# Patient Record
Sex: MALE | Race: WHITE | NOT HISPANIC OR LATINO
[De-identification: names, ages, dates, MRNs, and addresses within clinical notes are randomized per-mention and may not be internally consistent; named-entity substitution may affect disease eponyms.]

---

## 2021-03-24 ENCOUNTER — APPOINTMENT (OUTPATIENT)
Dept: ULTRASOUND IMAGING | Facility: CLINIC | Age: 56
End: 2021-03-24
Payer: COMMERCIAL

## 2021-03-24 PROCEDURE — 76857 US EXAM PELVIC LIMITED: CPT

## 2021-03-24 PROCEDURE — 76882 US LMTD JT/FCL EVL NVASC XTR: CPT | Mod: LT

## 2021-11-23 ENCOUNTER — NON-APPOINTMENT (OUTPATIENT)
Age: 56
End: 2021-11-23

## 2021-11-23 ENCOUNTER — APPOINTMENT (OUTPATIENT)
Dept: UROLOGY | Facility: CLINIC | Age: 56
End: 2021-11-23
Payer: COMMERCIAL

## 2021-11-23 VITALS
OXYGEN SATURATION: 98 % | SYSTOLIC BLOOD PRESSURE: 108 MMHG | HEART RATE: 62 BPM | DIASTOLIC BLOOD PRESSURE: 67 MMHG | BODY MASS INDEX: 20.73 KG/M2 | WEIGHT: 140 LBS | TEMPERATURE: 96.8 F | HEIGHT: 69 IN

## 2021-11-23 DIAGNOSIS — Z83.3 FAMILY HISTORY OF DIABETES MELLITUS: ICD-10-CM

## 2021-11-23 DIAGNOSIS — N43.40 SPERMATOCELE OF EPIDIDYMIS, UNSPECIFIED: ICD-10-CM

## 2021-11-23 DIAGNOSIS — E78.2 MIXED HYPERLIPIDEMIA: ICD-10-CM

## 2021-11-23 DIAGNOSIS — Z78.9 OTHER SPECIFIED HEALTH STATUS: ICD-10-CM

## 2021-11-23 DIAGNOSIS — R19.4 CHANGE IN BOWEL HABIT: ICD-10-CM

## 2021-11-23 DIAGNOSIS — N50.812 LEFT TESTICULAR PAIN: ICD-10-CM

## 2021-11-23 PROBLEM — Z00.00 ENCOUNTER FOR PREVENTIVE HEALTH EXAMINATION: Status: ACTIVE | Noted: 2021-11-23

## 2021-11-23 PROCEDURE — 99203 OFFICE O/P NEW LOW 30 MIN: CPT

## 2021-11-23 RX ORDER — TAMSULOSIN HYDROCHLORIDE 0.4 MG/1
0.4 CAPSULE ORAL
Refills: 0 | Status: DISCONTINUED | COMMUNITY
End: 2021-11-23

## 2021-11-23 NOTE — PHYSICAL EXAM
[General Appearance - Well Developed] : well developed [General Appearance - Well Nourished] : well nourished [Normal Appearance] : normal appearance [Well Groomed] : well groomed [General Appearance - In No Acute Distress] : no acute distress [Edema] : no peripheral edema [Respiration, Rhythm And Depth] : normal respiratory rhythm and effort [Exaggerated Use Of Accessory Muscles For Inspiration] : no accessory muscle use [Abdomen Soft] : soft [Costovertebral Angle Tenderness] : no ~M costovertebral angle tenderness [Urethral Meatus] : meatus normal [Urinary Bladder Findings] : the bladder was normal on palpation [Scrotum] : the scrotum was normal [Testes Mass (___cm)] : there were no testicular masses [FreeTextEntry1] : small and painful cystic lesion close to the left epididymal head. Palpation of this lesion caused pain  - 8/10 [Normal Station and Gait] : the gait and station were normal for the patient's age [] : no rash [No Focal Deficits] : no focal deficits [Oriented To Time, Place, And Person] : oriented to person, place, and time [Affect] : the affect was normal [Mood] : the mood was normal [Not Anxious] : not anxious [No Palpable Adenopathy] : no palpable adenopathy

## 2021-11-23 NOTE — HISTORY OF PRESENT ILLNESS
[FreeTextEntry1] : 56 year-old patient presented today with the left testicular pain. This pain takes place one in a while but during these episodes pain could be described as 6-7/10\par Patient denies chills and fever he also denies STD\par Patient also complained on the left low quadrant abdominal pain and recently noticed change in the bowel movements.

## 2021-11-23 NOTE — REVIEW OF SYSTEMS
[Told you have blood in urine on a urine test] : told blood was present in a urine test [Wake up at night to urinate  How many times?  ___] : wakes up to urinate [unfilled] times during the night [Strong urge to urinate] : strong urge to urinate [Interrupted urine stream] : interrupted urine stream [Leakage of urine with urgency] : leakage of urine with urgency [Urine Infection (bladder/kidney)] : denies bladder/kidney infections [Pain during urination] : denies pain during urination [Pain at onset of urination] : denies pain during onset of urination [Pain after urination] : denies pain after urination [Blood in urine that you can see] : denies seeing blood in urine [Discharge from urine canal] : denies discharge from urine canal [History of kidney stones] : denies history of kidney stones [Urine retention] : denies urine retention [Bladder pressure] : denies bladder pressure [Strain or push to urinate] : denies straining or pushing to urinate [Wait a long time to urinate] : denies waiting a long time to urinate [Slow urine stream] : denies slow urine stream [Bladder fullness after urinating] : denies bladder fullness after urinating [Increased pain/discomfort with bladder filling] : denies increased pain/discomfort with bladder filling [Bladder problems as child. If yes, describe..] : denies bladder problems as child [Leakage of urine with straining, coughing, laughing] : denies leakage of urine with straining, coughing, and/or laughing [Unaware of when urine is leaking] : aware of when urine is leaking

## 2021-11-23 NOTE — ASSESSMENT
[FreeTextEntry1] : Patient suffers from periodic left testicular pains.\par On physical exam we found a painful lesion close to the head of left epididymal head. \par Patient also suffers from the change in bowel habits \par His left low abdomen is mildly tender\par \par I recommended - US scrotum\par I also advised patient to see his PCP and GI for the left abdominal pain and changes in the bowel habits.

## 2021-11-23 NOTE — LETTER BODY
[Consult Letter:] : I had the pleasure of evaluating your patient, [unfilled]. [Consult Closing:] : Thank you very much for allowing me to participate in the care of this patient.  If you have any questions, please do not hesitate to contact me. [FreeTextEntry1] : Patient suffers from periodic left testicular pains. On physical exam we found a painful lesion close to the head of left epididymal head.  Patient also suffers from the change in bowel habits  His left low abdomen is mildly tender  I recommended - US scrotum I also advised patient to see his PCP and GI for the left abdominal pain and changes in the bowel habits.

## 2022-12-09 ENCOUNTER — APPOINTMENT (OUTPATIENT)
Dept: CT IMAGING | Facility: CLINIC | Age: 57
End: 2022-12-09

## 2022-12-09 PROCEDURE — 74178 CT ABD&PLV WO CNTR FLWD CNTR: CPT

## 2022-12-19 ENCOUNTER — APPOINTMENT (OUTPATIENT)
Dept: UROLOGY | Facility: CLINIC | Age: 57
End: 2022-12-19

## 2022-12-19 ENCOUNTER — NON-APPOINTMENT (OUTPATIENT)
Age: 57
End: 2022-12-19

## 2022-12-19 VITALS
HEART RATE: 66 BPM | SYSTOLIC BLOOD PRESSURE: 129 MMHG | BODY MASS INDEX: 20.73 KG/M2 | WEIGHT: 140 LBS | TEMPERATURE: 97.3 F | HEIGHT: 69 IN | DIASTOLIC BLOOD PRESSURE: 69 MMHG

## 2022-12-19 DIAGNOSIS — N20.0 CALCULUS OF KIDNEY: ICD-10-CM

## 2022-12-19 DIAGNOSIS — N20.1 CALCULUS OF URETER: ICD-10-CM

## 2022-12-19 PROCEDURE — 99214 OFFICE O/P EST MOD 30 MIN: CPT

## 2022-12-19 NOTE — PHYSICAL EXAM
[General Appearance - Well Developed] : well developed [General Appearance - Well Nourished] : well nourished [Normal Appearance] : normal appearance [Well Groomed] : well groomed [General Appearance - In No Acute Distress] : no acute distress [Abdomen Soft] : soft [Costovertebral Angle Tenderness] : no ~M costovertebral angle tenderness [FreeTextEntry1] : Tender in the left low abdomen  [Urethral Meatus] : meatus normal [Urinary Bladder Findings] : the bladder was normal on palpation [Scrotum] : the scrotum was normal [Testes Mass (___cm)] : there were no testicular masses [Edema] : no peripheral edema [] : no respiratory distress [Respiration, Rhythm And Depth] : normal respiratory rhythm and effort [Exaggerated Use Of Accessory Muscles For Inspiration] : no accessory muscle use [Oriented To Time, Place, And Person] : oriented to person, place, and time [Affect] : the affect was normal [Mood] : the mood was normal [Not Anxious] : not anxious [Normal Station and Gait] : the gait and station were normal for the patient's age [No Focal Deficits] : no focal deficits [No Palpable Adenopathy] : no palpable adenopathy

## 2022-12-19 NOTE — HISTORY OF PRESENT ILLNESS
[FreeTextEntry1] : 57 year-old patient presented today with the left testicular pain. \par Recently patient has a CT scan done for the left abdominal pain.  \par On the CT scan nephrolithiasis was found this why he was sent to me.

## 2022-12-19 NOTE — ASSESSMENT
[FreeTextEntry1] : Patient presented today to discuss the need for the treatment of the nephrolithiasis.\par I personally reviewed his most recent CT scan and discussed with him the findings.  There is a 7 on 11 millimeters stone in the right upper ureter that caused right hydronephrosis.  In the left kidney there is a 7 mm stone in the left low calyx.\par I discussed with the patient the need to do the right ureteroscopy as soon as possible because he has obstruction of the right renal system.\par I asked patient to check urine culture before the procedure.\par Will try to do the procedure soon as possible.

## 2022-12-21 LAB — BACTERIA UR CULT: NORMAL

## 2022-12-23 ENCOUNTER — APPOINTMENT (OUTPATIENT)
Dept: UROLOGY | Facility: HOSPITAL | Age: 57
End: 2022-12-23
Payer: COMMERCIAL

## 2022-12-23 PROCEDURE — 88300 SURGICAL PATH GROSS: CPT | Mod: 26

## 2022-12-30 ENCOUNTER — APPOINTMENT (OUTPATIENT)
Dept: UROLOGY | Facility: CLINIC | Age: 57
End: 2022-12-30

## 2022-12-30 ENCOUNTER — APPOINTMENT (OUTPATIENT)
Dept: UROLOGY | Facility: CLINIC | Age: 57
End: 2022-12-30
Payer: COMMERCIAL

## 2022-12-30 VITALS
SYSTOLIC BLOOD PRESSURE: 120 MMHG | DIASTOLIC BLOOD PRESSURE: 69 MMHG | BODY MASS INDEX: 20.73 KG/M2 | WEIGHT: 140 LBS | TEMPERATURE: 97.8 F | HEIGHT: 69 IN | HEART RATE: 79 BPM

## 2022-12-30 PROCEDURE — 52310 CYSTOSCOPY AND TREATMENT: CPT

## 2023-05-16 ENCOUNTER — APPOINTMENT (OUTPATIENT)
Dept: ULTRASOUND IMAGING | Facility: CLINIC | Age: 58
End: 2023-05-16
Payer: COMMERCIAL

## 2023-05-16 PROCEDURE — 76775 US EXAM ABDO BACK WALL LIM: CPT

## 2023-05-19 ENCOUNTER — APPOINTMENT (OUTPATIENT)
Dept: UROLOGY | Facility: CLINIC | Age: 58
End: 2023-05-19
Payer: COMMERCIAL

## 2023-05-19 VITALS
WEIGHT: 170 LBS | HEART RATE: 77 BPM | BODY MASS INDEX: 25.18 KG/M2 | DIASTOLIC BLOOD PRESSURE: 74 MMHG | HEIGHT: 69 IN | SYSTOLIC BLOOD PRESSURE: 128 MMHG | TEMPERATURE: 97.3 F

## 2023-05-19 PROCEDURE — 99214 OFFICE O/P EST MOD 30 MIN: CPT

## 2023-05-19 RX ORDER — TRAMADOL HYDROCHLORIDE 50 MG/1
50 TABLET, COATED ORAL
Qty: 15 | Refills: 0 | Status: DISCONTINUED | COMMUNITY
Start: 2022-12-24 | End: 2023-05-19

## 2023-05-19 RX ORDER — TAMSULOSIN HYDROCHLORIDE 0.4 MG/1
0.4 CAPSULE ORAL
Qty: 15 | Refills: 0 | Status: DISCONTINUED | COMMUNITY
Start: 2022-12-24 | End: 2023-05-19

## 2023-05-19 RX ORDER — CIPROFLOXACIN HYDROCHLORIDE 500 MG/1
500 TABLET, FILM COATED ORAL TWICE DAILY
Qty: 10 | Refills: 0 | Status: DISCONTINUED | COMMUNITY
Start: 2022-12-24 | End: 2023-05-19

## 2023-05-19 RX ORDER — ACETAMINOPHEN 500 MG/1
500 TABLET, COATED ORAL
Qty: 30 | Refills: 0 | Status: DISCONTINUED | COMMUNITY
Start: 2022-12-24 | End: 2023-05-19

## 2023-05-19 NOTE — ASSESSMENT
[FreeTextEntry1] : I personally reviewed patient's US and found that he has right hydronephrosis\par I explained to him that we have to do CTU to exclude right ureteral stricture that could be as a consequence of pervious impacted stone

## 2023-05-19 NOTE — HISTORY OF PRESENT ILLNESS
[FreeTextEntry1] : 57 year-old patient was sent to our office by PCP because of hydronephrosis - Right \par Patient had elevated Creatinine and was sent for US that showed Right hydronephrosis\par Patient had Right  ureteroscopy 8 months ago for Right upper ureteral impacted stone with sever Right hydronephrosis \par Today has no chills and fever\par No pain

## 2023-05-19 NOTE — PHYSICAL EXAM
[General Appearance - Well Developed] : well developed [General Appearance - Well Nourished] : well nourished [Normal Appearance] : normal appearance [Well Groomed] : well groomed [General Appearance - In No Acute Distress] : no acute distress [Abdomen Soft] : soft [Costovertebral Angle Tenderness] : no ~M costovertebral angle tenderness [Urethral Meatus] : meatus normal [Urinary Bladder Findings] : the bladder was normal on palpation [Scrotum] : the scrotum was normal [Testes Mass (___cm)] : there were no testicular masses [Edema] : no peripheral edema [] : no respiratory distress [Respiration, Rhythm And Depth] : normal respiratory rhythm and effort [Exaggerated Use Of Accessory Muscles For Inspiration] : no accessory muscle use [Oriented To Time, Place, And Person] : oriented to person, place, and time [Affect] : the affect was normal [Mood] : the mood was normal [Not Anxious] : not anxious [Normal Station and Gait] : the gait and station were normal for the patient's age [No Focal Deficits] : no focal deficits [No Palpable Adenopathy] : no palpable adenopathy

## 2023-06-07 ENCOUNTER — APPOINTMENT (OUTPATIENT)
Dept: CT IMAGING | Facility: CLINIC | Age: 58
End: 2023-06-07
Payer: COMMERCIAL

## 2023-06-07 PROCEDURE — 74178 CT ABD&PLV WO CNTR FLWD CNTR: CPT

## 2023-06-15 ENCOUNTER — APPOINTMENT (OUTPATIENT)
Dept: UROLOGY | Facility: CLINIC | Age: 58
End: 2023-06-15
Payer: COMMERCIAL

## 2023-06-15 VITALS
WEIGHT: 165 LBS | HEIGHT: 69 IN | SYSTOLIC BLOOD PRESSURE: 130 MMHG | BODY MASS INDEX: 24.44 KG/M2 | HEART RATE: 72 BPM | TEMPERATURE: 98.1 F | DIASTOLIC BLOOD PRESSURE: 87 MMHG

## 2023-06-15 PROCEDURE — 99213 OFFICE O/P EST LOW 20 MIN: CPT

## 2023-06-18 NOTE — PHYSICAL EXAM
[General Appearance - Well Developed] : well developed [General Appearance - Well Nourished] : well nourished [Normal Appearance] : normal appearance [Well Groomed] : well groomed [General Appearance - In No Acute Distress] : no acute distress [Abdomen Soft] : soft [Costovertebral Angle Tenderness] : no ~M costovertebral angle tenderness [Urethral Meatus] : meatus normal [Scrotum] : the scrotum was normal [Urinary Bladder Findings] : the bladder was normal on palpation [Testes Mass (___cm)] : there were no testicular masses [Edema] : no peripheral edema [] : no respiratory distress [Respiration, Rhythm And Depth] : normal respiratory rhythm and effort [Exaggerated Use Of Accessory Muscles For Inspiration] : no accessory muscle use [Oriented To Time, Place, And Person] : oriented to person, place, and time [Affect] : the affect was normal [Mood] : the mood was normal [Not Anxious] : not anxious [Normal Station and Gait] : the gait and station were normal for the patient's age [No Focal Deficits] : no focal deficits [No Palpable Adenopathy] : no palpable adenopathy

## 2023-06-18 NOTE — ASSESSMENT
[FreeTextEntry1] : I personally reviewed patient's CTU and explained to him that his right kidney is obstructed.  There is a hydronephrosis.  However patient's parenchyma looks good.\par We decided to perform right ureteral pyeloscopy as soon as possible with the laser treatment of the laser and insertion of the double-J stent.\par I explained to the patient in details the procedure and its postoperative process

## 2023-06-18 NOTE — HISTORY OF PRESENT ILLNESS
[FreeTextEntry1] : 57 year-old patient presented today to discuss the recent CTU.  \par Previous patient was seen in our office because of hydronephrosis - Right \par Patient had elevated Creatinine and was sent for US that showed Right hydronephrosis\par Patient had Right  ureteroscopy 8 months ago for Right upper ureteral impacted stone with sever Right hydronephrosis \par

## 2023-07-07 ENCOUNTER — APPOINTMENT (OUTPATIENT)
Dept: UROLOGY | Facility: HOSPITAL | Age: 58
End: 2023-07-07

## 2023-07-13 ENCOUNTER — APPOINTMENT (OUTPATIENT)
Dept: UROLOGY | Facility: CLINIC | Age: 58
End: 2023-07-13
Payer: COMMERCIAL

## 2023-07-13 VITALS
SYSTOLIC BLOOD PRESSURE: 132 MMHG | WEIGHT: 165 LBS | HEART RATE: 80 BPM | TEMPERATURE: 97.3 F | BODY MASS INDEX: 24.44 KG/M2 | HEIGHT: 69 IN | DIASTOLIC BLOOD PRESSURE: 77 MMHG

## 2023-07-13 DIAGNOSIS — T83.84XD: ICD-10-CM

## 2023-07-13 DIAGNOSIS — R39.15 URGENCY OF URINATION: ICD-10-CM

## 2023-07-13 PROCEDURE — 99212 OFFICE O/P EST SF 10 MIN: CPT

## 2023-07-13 NOTE — ASSESSMENT
[FreeTextEntry1] : Patient is 1 week after the procedure.\par Feels good.\par His only concern is urgency and frequency to urinate that could be explained by the large diameter stent.\par I prescribe patient's tamsulosin and Myrbetriq.\par We will take out the stent in 6 weeks after the procedure

## 2023-07-13 NOTE — HISTORY OF PRESENT ILLNESS
[FreeTextEntry1] : 57 year-old patient presented today for the postoperative follow-up. \par Last time we did right flexible ureteroscopy with laser ablation of right ureteral stricture. \par We inserted the double-J stent that is 8 Vietnamese\par We are planning to take out the stent in 6 weeks after the procedure\par Today patient reported urgency frequency burning sensation and sometimes dull pain in the right lumbar area.\par No chills no fever no hematuria

## 2023-08-28 ENCOUNTER — APPOINTMENT (OUTPATIENT)
Dept: UROLOGY | Facility: CLINIC | Age: 58
End: 2023-08-28
Payer: COMMERCIAL

## 2023-08-28 VITALS
WEIGHT: 165 LBS | TEMPERATURE: 97.3 F | HEART RATE: 71 BPM | SYSTOLIC BLOOD PRESSURE: 118 MMHG | DIASTOLIC BLOOD PRESSURE: 71 MMHG | HEIGHT: 69 IN | BODY MASS INDEX: 24.44 KG/M2

## 2023-08-28 PROCEDURE — 52315 CYSTOSCOPY AND TREATMENT: CPT

## 2023-09-15 ENCOUNTER — RESULT REVIEW (OUTPATIENT)
Age: 58
End: 2023-09-15

## 2023-09-15 ENCOUNTER — APPOINTMENT (OUTPATIENT)
Dept: CT IMAGING | Facility: CLINIC | Age: 58
End: 2023-09-15
Payer: COMMERCIAL

## 2023-09-15 PROCEDURE — 74178 CT ABD&PLV WO CNTR FLWD CNTR: CPT

## 2023-09-20 ENCOUNTER — APPOINTMENT (OUTPATIENT)
Dept: OPHTHALMOLOGY | Facility: CLINIC | Age: 58
End: 2023-09-20
Payer: COMMERCIAL

## 2023-09-20 ENCOUNTER — NON-APPOINTMENT (OUTPATIENT)
Age: 58
End: 2023-09-20

## 2023-09-20 PROCEDURE — 92004 COMPRE OPH EXAM NEW PT 1/>: CPT

## 2023-09-27 ENCOUNTER — APPOINTMENT (OUTPATIENT)
Dept: UROLOGY | Facility: CLINIC | Age: 58
End: 2023-09-27
Payer: COMMERCIAL

## 2023-09-27 VITALS
SYSTOLIC BLOOD PRESSURE: 157 MMHG | HEART RATE: 59 BPM | TEMPERATURE: 98.3 F | DIASTOLIC BLOOD PRESSURE: 89 MMHG | HEIGHT: 69 IN

## 2023-09-27 PROCEDURE — 99214 OFFICE O/P EST MOD 30 MIN: CPT

## 2023-10-27 ENCOUNTER — APPOINTMENT (OUTPATIENT)
Dept: OPHTHALMOLOGY | Facility: CLINIC | Age: 58
End: 2023-10-27

## 2023-10-27 ENCOUNTER — APPOINTMENT (OUTPATIENT)
Dept: UROLOGY | Facility: CLINIC | Age: 58
End: 2023-10-27
Payer: COMMERCIAL

## 2023-10-27 VITALS
WEIGHT: 165 LBS | SYSTOLIC BLOOD PRESSURE: 129 MMHG | HEART RATE: 66 BPM | BODY MASS INDEX: 24.44 KG/M2 | DIASTOLIC BLOOD PRESSURE: 80 MMHG | HEIGHT: 69 IN | TEMPERATURE: 97.3 F

## 2023-10-27 PROCEDURE — 99214 OFFICE O/P EST MOD 30 MIN: CPT | Mod: 25

## 2023-11-02 ENCOUNTER — APPOINTMENT (OUTPATIENT)
Dept: UROLOGY | Facility: CLINIC | Age: 58
End: 2023-11-02

## 2023-11-03 ENCOUNTER — APPOINTMENT (OUTPATIENT)
Dept: UROLOGY | Facility: CLINIC | Age: 58
End: 2023-11-03
Payer: COMMERCIAL

## 2023-11-03 PROCEDURE — 99214 OFFICE O/P EST MOD 30 MIN: CPT

## 2023-11-10 ENCOUNTER — APPOINTMENT (OUTPATIENT)
Dept: UROLOGY | Facility: CLINIC | Age: 58
End: 2023-11-10
Payer: COMMERCIAL

## 2023-11-10 VITALS
HEART RATE: 74 BPM | DIASTOLIC BLOOD PRESSURE: 74 MMHG | OXYGEN SATURATION: 98 % | BODY MASS INDEX: 25.18 KG/M2 | WEIGHT: 170 LBS | SYSTOLIC BLOOD PRESSURE: 121 MMHG | TEMPERATURE: 97.2 F | HEIGHT: 69 IN

## 2023-11-10 DIAGNOSIS — N13.30 UNSPECIFIED HYDRONEPHROSIS: ICD-10-CM

## 2023-11-10 DIAGNOSIS — N13.5 CROSSING VESSEL AND STRICTURE OF URETER W/OUT HYDRONEPHROSIS: ICD-10-CM

## 2023-11-10 DIAGNOSIS — N26.1 ATROPHY OF KIDNEY (TERMINAL): ICD-10-CM

## 2023-11-10 DIAGNOSIS — N13.1 HYDRONEPHROSIS WITH URETERAL STRICTURE, NOT ELSEWHERE CLASSIFIED: ICD-10-CM

## 2023-11-10 PROCEDURE — 99214 OFFICE O/P EST MOD 30 MIN: CPT

## 2023-11-10 RX ORDER — MIRABEGRON 25 MG/1
25 TABLET, FILM COATED, EXTENDED RELEASE ORAL DAILY
Qty: 30 | Refills: 2 | Status: DISCONTINUED | COMMUNITY
Start: 2023-07-13 | End: 2023-11-10

## 2023-11-10 RX ORDER — COLCHICINE 0.6 MG/1
0.6 TABLET ORAL TWICE DAILY
Qty: 60 | Refills: 3 | Status: DISCONTINUED | COMMUNITY
Start: 2023-07-08 | End: 2023-11-10

## 2023-11-12 PROBLEM — N13.1 HYDRONEPHROSIS WITH URETERAL STRICTURE, NOT ELSEWHERE CLASSIFIED: Status: ACTIVE | Noted: 2023-11-12

## 2023-11-12 PROBLEM — N26.1 ATROPHY, KIDNEY: Status: ACTIVE | Noted: 2023-11-03

## 2023-11-12 PROBLEM — N13.1 HYDRONEPHROSIS WITH URETERAL STRICTURE, NOT ELSEWHERE CLASSIFIED: Status: RESOLVED | Noted: 2023-06-15 | Resolved: 2023-11-12

## 2023-11-12 PROBLEM — N13.30 HYDRONEPHROSIS, RIGHT: Status: ACTIVE | Noted: 2022-12-19

## 2023-11-12 PROBLEM — N13.5 URETERAL STRICTURE: Status: ACTIVE | Noted: 2023-11-12

## 2024-01-05 ENCOUNTER — APPOINTMENT (OUTPATIENT)
Dept: NUCLEAR MEDICINE | Facility: HOSPITAL | Age: 59
End: 2024-01-05
Payer: COMMERCIAL

## 2024-01-05 ENCOUNTER — OUTPATIENT (OUTPATIENT)
Dept: OUTPATIENT SERVICES | Facility: HOSPITAL | Age: 59
LOS: 1 days | End: 2024-01-05

## 2024-01-05 DIAGNOSIS — N20.1 CALCULUS OF URETER: ICD-10-CM

## 2024-01-05 PROCEDURE — 78707 K FLOW/FUNCT IMAGE W/O DRUG: CPT | Mod: 26

## 2024-02-29 ENCOUNTER — OUTPATIENT (OUTPATIENT)
Dept: OUTPATIENT SERVICES | Facility: HOSPITAL | Age: 59
LOS: 1 days | End: 2024-02-29

## 2024-02-29 VITALS
OXYGEN SATURATION: 98 % | SYSTOLIC BLOOD PRESSURE: 144 MMHG | HEIGHT: 69 IN | RESPIRATION RATE: 16 BRPM | HEART RATE: 83 BPM | DIASTOLIC BLOOD PRESSURE: 90 MMHG | TEMPERATURE: 97 F | WEIGHT: 171.96 LBS

## 2024-02-29 DIAGNOSIS — N13.5 CROSSING VESSEL AND STRICTURE OF URETER WITHOUT HYDRONEPHROSIS: ICD-10-CM

## 2024-02-29 DIAGNOSIS — Z93.6 OTHER ARTIFICIAL OPENINGS OF URINARY TRACT STATUS: Chronic | ICD-10-CM

## 2024-02-29 DIAGNOSIS — Z96.0 PRESENCE OF UROGENITAL IMPLANTS: Chronic | ICD-10-CM

## 2024-02-29 LAB
ANION GAP SERPL CALC-SCNC: 8 MMOL/L — SIGNIFICANT CHANGE UP (ref 7–14)
BLD GP AB SCN SERPL QL: NEGATIVE — SIGNIFICANT CHANGE UP
BUN SERPL-MCNC: 19 MG/DL — SIGNIFICANT CHANGE UP (ref 7–23)
CALCIUM SERPL-MCNC: 9.2 MG/DL — SIGNIFICANT CHANGE UP (ref 8.4–10.5)
CHLORIDE SERPL-SCNC: 105 MMOL/L — SIGNIFICANT CHANGE UP (ref 98–107)
CO2 SERPL-SCNC: 27 MMOL/L — SIGNIFICANT CHANGE UP (ref 22–31)
CREAT SERPL-MCNC: 1.4 MG/DL — HIGH (ref 0.5–1.3)
EGFR: 58 ML/MIN/1.73M2 — LOW
GLUCOSE SERPL-MCNC: 94 MG/DL — SIGNIFICANT CHANGE UP (ref 70–99)
HCT VFR BLD CALC: 44.9 % — SIGNIFICANT CHANGE UP (ref 39–50)
HGB BLD-MCNC: 14.7 G/DL — SIGNIFICANT CHANGE UP (ref 13–17)
MCHC RBC-ENTMCNC: 29.5 PG — SIGNIFICANT CHANGE UP (ref 27–34)
MCHC RBC-ENTMCNC: 32.7 GM/DL — SIGNIFICANT CHANGE UP (ref 32–36)
MCV RBC AUTO: 90 FL — SIGNIFICANT CHANGE UP (ref 80–100)
NRBC # BLD: 0 /100 WBCS — SIGNIFICANT CHANGE UP (ref 0–0)
NRBC # FLD: 0 K/UL — SIGNIFICANT CHANGE UP (ref 0–0)
PLATELET # BLD AUTO: 164 K/UL — SIGNIFICANT CHANGE UP (ref 150–400)
POTASSIUM SERPL-MCNC: 4.3 MMOL/L — SIGNIFICANT CHANGE UP (ref 3.5–5.3)
POTASSIUM SERPL-SCNC: 4.3 MMOL/L — SIGNIFICANT CHANGE UP (ref 3.5–5.3)
RBC # BLD: 4.99 M/UL — SIGNIFICANT CHANGE UP (ref 4.2–5.8)
RBC # FLD: 12.5 % — SIGNIFICANT CHANGE UP (ref 10.3–14.5)
RH IG SCN BLD-IMP: POSITIVE — SIGNIFICANT CHANGE UP
RH IG SCN BLD-IMP: POSITIVE — SIGNIFICANT CHANGE UP
SODIUM SERPL-SCNC: 140 MMOL/L — SIGNIFICANT CHANGE UP (ref 135–145)
WBC # BLD: 3.27 K/UL — LOW (ref 3.8–10.5)
WBC # FLD AUTO: 3.27 K/UL — LOW (ref 3.8–10.5)

## 2024-02-29 RX ORDER — SODIUM CHLORIDE 9 MG/ML
1000 INJECTION, SOLUTION INTRAVENOUS
Refills: 0 | Status: DISCONTINUED | OUTPATIENT
Start: 2024-03-06 | End: 2024-03-07

## 2024-02-29 NOTE — H&P PST ADULT - PROBLEM SELECTOR PLAN 1
Patient tentatively scheduled for right nephrectomy     Pre-op instructions provided. Hibiclens provided with instructions and was signed by patient. Teach-back method was utilized to assess patient's understanding. Patient verbalized understanding.    ordered CBC, BMP, Type and Retype Patient tentatively scheduled for right nephrectomy     Pre-op instructions provided. Hibiclens provided with instructions and was signed by patient. Teach-back method was utilized to assess patient's understanding. Patient verbalized understanding.    ordered CBC, BMP, urine culture and Type and Retype

## 2024-02-29 NOTE — H&P PST ADULT - NSICDXPASTMEDICALHX_GEN_ALL_CORE_FT
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

## 2024-02-29 NOTE — H&P PST ADULT - NSANTHOSAYNRD_GEN_A_CORE
No. RAQUEL screening performed.  STOP BANG Legend: 0-2 = LOW Risk; 3-4 = INTERMEDIATE Risk; 5-8 = HIGH Risk

## 2024-02-29 NOTE — H&P PST ADULT - NSICDXFAMILYHX_GEN_ALL_CORE_FT
FAMILY HISTORY:  Mother  Still living? Unknown  Family history of gastric cancer, Age at diagnosis: Age Unknown

## 2024-02-29 NOTE — H&P PST ADULT - HISTORY OF PRESENT ILLNESS
58 year-old Patient is known to suffer from the right ureter stricture says this was impacted stone. stricture was treated with laser , after 2 months of the stent removal follow diagnostic revealed reoccurrence hydronephrosis,  insert the double-J stent failed and patient got the right nephrostomy., now patient is scheduled for righ nephrectomy  58 year-old Patient with hx of the right ureter stricture secondary to the impacted stone. Stricture was treated with laser and JJ stent placement , after 2 months of the stent removal and follow up diagnostic image revealed reoccurrence hydronephrosis,  Patient underwent insertion of the  double-J stent, which was failed and patient had the right nephrostomy., complications lead to atrophy of the right kidney, now patient is scheduled for right nephrectomy

## 2024-02-29 NOTE — H&P PST ADULT - RESPIRATORY
normal/clear to auscultation bilaterally/no wheezes/no rales/no rhonchi normal/clear to auscultation bilaterally/no wheezes/no rales

## 2024-03-01 LAB
CULTURE RESULTS: NO GROWTH — SIGNIFICANT CHANGE UP
SPECIMEN SOURCE: SIGNIFICANT CHANGE UP

## 2024-03-05 ENCOUNTER — TRANSCRIPTION ENCOUNTER (OUTPATIENT)
Age: 59
End: 2024-03-05

## 2024-03-05 NOTE — ASU PATIENT PROFILE, ADULT - NSICDXPASTMEDICALHX_GEN_ALL_CORE_FT
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

## 2024-03-05 NOTE — ASU PATIENT PROFILE, ADULT - HEALTH/HEALTHCARE ANXIETIES, PROFILE
Refill passed per [facility] protocol. Requested Prescriptions   Pending Prescriptions Disp Refills    pravastatin 10 MG Oral Tab [Pharmacy Med Name: pravastatin 10 mg tablet] 90 tablet 1     Sig: Take 1 tablet (10 mg total) by mouth nightly.         Cholesterol Medication Protocol Passed - 9/22/2021  7:49 AM        Passed - ALT in past 12 months        Passed - LDL in past 12 months        Passed - Last ALT < 80       Lab Results   Component Value Date    ALT 40 09/04/2021             Passed - Last LDL < 130     Lab Results   Component Value Date     (H) 09/04/2021               Passed - Appointment in past 12 or next 3 months               Future Appointments         Provider Department Appt Notes    In 4 days [place]             Recent Outpatient Visits              5 months ago Routine general medical examination at a health care facility    [address]    Office Visit    6 months ago Nasal polyps    [facility], [address] [facility], Darylene Stalls, MD    Office Visit    7 months ago Gallstones    [address], [address] [place] [address]    Office Visit    8 months ago Calculus of gallbladder without cholecystitis without obstruction    [facility] Surgery Lucie Cristina MD    Office Visit    8 months ago RUQ abdominal pain    [facility], [address], DO    Office Visit
Refill passed per [facility] protocol. Requested Prescriptions   Pending Prescriptions Disp Refills    PRAVASTATIN 10 MG Oral Tab [Pharmacy Med Name: pravastatin 10 mg tablet] 90 tablet 0     Sig: Take 1 tablet (10 mg total) by mouth nightly.         Cholesterol Medication Protocol Passed - 9/22/2021  7:49 AM        Passed - ALT in past 12 months        Passed - LDL in past 12 months        Passed - Last ALT < 80       Lab Results   Component Value Date    ALT 40 09/04/2021             Passed - Last LDL < 130     Lab Results   Component Value Date     (H) 09/04/2021               Passed - Appointment in past 12 or next 3 months               Future Appointments         Provider Department Appt Notes    In 4 days Jordon Orr             Recent Outpatient Visits              5 months ago Routine general medical examination at a health care facility    [facility], [address] [facility] [address] [place], [place]    Office Visit    6 months ago Nasal polyps    [facility], Magnolia Ordonez MD    Office Visit    7 months ago Gallstones    [facility], [address], [place]    Office Visit    8 months ago Calculus of gallbladder without cholecystitis without obstruction    [facility] Surgery Vance Bello MD    Office Visit    8 months ago RUQ abdominal pain    Chai Vines DO    Office Visit
pre op anxiety

## 2024-03-06 ENCOUNTER — RESULT REVIEW (OUTPATIENT)
Age: 59
End: 2024-03-06

## 2024-03-06 ENCOUNTER — INPATIENT (INPATIENT)
Facility: HOSPITAL | Age: 59
LOS: 0 days | Discharge: HOME | End: 2024-03-07
Attending: UROLOGY | Admitting: UROLOGY
Payer: MEDICAID

## 2024-03-06 ENCOUNTER — APPOINTMENT (OUTPATIENT)
Dept: UROLOGY | Facility: HOSPITAL | Age: 59
End: 2024-03-06

## 2024-03-06 VITALS
WEIGHT: 171.96 LBS | HEIGHT: 69 IN | TEMPERATURE: 98 F | RESPIRATION RATE: 18 BRPM | OXYGEN SATURATION: 98 % | DIASTOLIC BLOOD PRESSURE: 77 MMHG | SYSTOLIC BLOOD PRESSURE: 123 MMHG | HEART RATE: 57 BPM

## 2024-03-06 DIAGNOSIS — F17.200 NICOTINE DEPENDENCE, UNSPECIFIED, UNCOMPLICATED: ICD-10-CM

## 2024-03-06 DIAGNOSIS — N26.1 ATROPHY OF KIDNEY (TERMINAL): ICD-10-CM

## 2024-03-06 DIAGNOSIS — R07.9 CHEST PAIN, UNSPECIFIED: ICD-10-CM

## 2024-03-06 DIAGNOSIS — N13.5 CROSSING VESSEL AND STRICTURE OF URETER WITHOUT HYDRONEPHROSIS: ICD-10-CM

## 2024-03-06 DIAGNOSIS — Z96.0 PRESENCE OF UROGENITAL IMPLANTS: Chronic | ICD-10-CM

## 2024-03-06 DIAGNOSIS — N18.9 CHRONIC KIDNEY DISEASE, UNSPECIFIED: ICD-10-CM

## 2024-03-06 DIAGNOSIS — Z93.6 OTHER ARTIFICIAL OPENINGS OF URINARY TRACT STATUS: Chronic | ICD-10-CM

## 2024-03-06 PROCEDURE — 50546 LAPAROSCOPIC NEPHRECTOMY: CPT | Mod: RT

## 2024-03-06 PROCEDURE — 99223 1ST HOSP IP/OBS HIGH 75: CPT

## 2024-03-06 PROCEDURE — 88307 TISSUE EXAM BY PATHOLOGIST: CPT | Mod: 26

## 2024-03-06 DEVICE — CLIP APPLIER COVIDIEN ENDOCLIP II 10MM MED/LG: Type: IMPLANTABLE DEVICE | Site: RIGHT | Status: FUNCTIONAL

## 2024-03-06 DEVICE — STAPLER COVIDIEN TRI-STAPLE 45MM TAN RELOAD: Type: IMPLANTABLE DEVICE | Site: RIGHT | Status: FUNCTIONAL

## 2024-03-06 DEVICE — LIGATING CLIPS WECK HEMOLOK POLYMER LARGE (PURPLE) 6: Type: IMPLANTABLE DEVICE | Site: RIGHT | Status: FUNCTIONAL

## 2024-03-06 DEVICE — SURGICEL 2 X 14": Type: IMPLANTABLE DEVICE | Site: RIGHT | Status: FUNCTIONAL

## 2024-03-06 RX ORDER — SENNA PLUS 8.6 MG/1
2 TABLET ORAL AT BEDTIME
Refills: 0 | Status: DISCONTINUED | OUTPATIENT
Start: 2024-03-06 | End: 2024-03-07

## 2024-03-06 RX ORDER — HEPARIN SODIUM 5000 [USP'U]/ML
5000 INJECTION INTRAVENOUS; SUBCUTANEOUS EVERY 8 HOURS
Refills: 0 | Status: DISCONTINUED | OUTPATIENT
Start: 2024-03-06 | End: 2024-03-07

## 2024-03-06 RX ORDER — ACETAMINOPHEN 500 MG
975 TABLET ORAL EVERY 6 HOURS
Refills: 0 | Status: DISCONTINUED | OUTPATIENT
Start: 2024-03-06 | End: 2024-03-07

## 2024-03-06 RX ORDER — HYDROMORPHONE HYDROCHLORIDE 2 MG/ML
0.5 INJECTION INTRAMUSCULAR; INTRAVENOUS; SUBCUTANEOUS
Refills: 0 | Status: DISCONTINUED | OUTPATIENT
Start: 2024-03-06 | End: 2024-03-06

## 2024-03-06 RX ORDER — ONDANSETRON 8 MG/1
4 TABLET, FILM COATED ORAL ONCE
Refills: 0 | Status: DISCONTINUED | OUTPATIENT
Start: 2024-03-06 | End: 2024-03-06

## 2024-03-06 RX ORDER — POLYETHYLENE GLYCOL 3350 17 G/17G
17 POWDER, FOR SOLUTION ORAL DAILY
Refills: 0 | Status: DISCONTINUED | OUTPATIENT
Start: 2024-03-06 | End: 2024-03-07

## 2024-03-06 RX ORDER — SODIUM CHLORIDE 9 MG/ML
1000 INJECTION, SOLUTION INTRAVENOUS
Refills: 0 | Status: DISCONTINUED | OUTPATIENT
Start: 2024-03-06 | End: 2024-03-07

## 2024-03-06 RX ADMIN — HYDROMORPHONE HYDROCHLORIDE 0.5 MILLIGRAM(S): 2 INJECTION INTRAMUSCULAR; INTRAVENOUS; SUBCUTANEOUS at 10:18

## 2024-03-06 RX ADMIN — Medication 975 MILLIGRAM(S): at 18:06

## 2024-03-06 RX ADMIN — HEPARIN SODIUM 5000 UNIT(S): 5000 INJECTION INTRAVENOUS; SUBCUTANEOUS at 15:34

## 2024-03-06 RX ADMIN — HYDROMORPHONE HYDROCHLORIDE 0.5 MILLIGRAM(S): 2 INJECTION INTRAMUSCULAR; INTRAVENOUS; SUBCUTANEOUS at 10:30

## 2024-03-06 RX ADMIN — HEPARIN SODIUM 5000 UNIT(S): 5000 INJECTION INTRAVENOUS; SUBCUTANEOUS at 21:35

## 2024-03-06 NOTE — PACU DISCHARGE NOTE - COMMENTS
PAP device was not downloadable as there was no information available. Patient has suffered a stroke therefore his wife brought in machine for download and let us know it is not working properly. She indicated that she requested a new PAP for him last year but never received it and she has great concerns that Mr. Eleazar Underwood is not receiving proper treatment. She stated that she is still receiving supplies from [employer] but has yet to receive the requested PAP device. She also indicated that he would not be able to do an in lab study if requested due to his current state of health. Advised I would relay this information to the doctor but also encouraged her to discuss during his virtual visit. patient is on Room Air.  NO anesthesia complications

## 2024-03-06 NOTE — CONSULT NOTE ADULT - PROBLEM SELECTOR RECOMMENDATION 4
Patient reports prior history of smoking, reports he quit years ago  - endorses current infrequent chewing tobacco use, states he is trying to quit  - patient offered nicotine replacement therapy but declining at this time

## 2024-03-06 NOTE — CONSULT NOTE ADULT - PROBLEM SELECTOR RECOMMENDATION 2
Patient noted with brief episode of chest pressure while in PACU after OR case  - patient reports that his symptoms were likely due to "anxiety"  - states he had similar episode after his previous surgery which also self resolved  - currently reports all symptoms resolved  - VSS and with NSR on telemetry during episode  - patient otherwise without any cardiac history  - if patient develops new onset of chest pain would obtain EKG and cardiac enzymes

## 2024-03-06 NOTE — CONSULT NOTE ADULT - SUBJECTIVE AND OBJECTIVE BOX
HPI: Patient is a 57 y/o M with history of atrophic right kidney that initially presented for planned R radical nephrectomy. Now s/p R Radical Nephrectomy on 3/6. Medicine following for comanagement. Patient seen and examined in PACU after OR case. Patient reports that he felt a brief period of chest tightness and anxiety shortly after his recent surgery was completed. Currently states that his chest tightness has completed resolved. Denies any shortness of breath. Does endorse some feeling of fullness in his abdomen which he states might be due to gas. Otherwise denies any CP, fever, nausea, vomiting, or dizziness.     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 No nausea, vomiting, or hematemesis; No diarrhea or constipation. No melena or hematochezia.  GENITOURINARY: No dysuria, frequency, hematuria, or incontinence  NEUROLOGICAL: No headaches, memory loss, loss of strength, numbness, or tremors  SKIN: No itching, burning, rashes, or lesions   MUSCULOSKELETAL: No joint pain or swelling; No muscle, back, or extremity pain    Allergies  No Known Allergies    Intolerances    Social History: Former smoker, endorses intermittent chewing tobacco use.    FAMILY HISTORY:  Family history of gastric cancer (Mother)    MEDICATIONS  (STANDING):  acetaminophen     Tablet .. 975 milliGRAM(s) Oral every 6 hours  heparin   Injectable 5000 Unit(s) SubCutaneous every 8 hours  lactated ringers. 1000 milliLiter(s) (30 mL/Hr) IV Continuous <Continuous>  lactated ringers. 1000 milliLiter(s) (125 mL/Hr) IV Continuous <Continuous>  polyethylene glycol 3350 17 Gram(s) Oral daily    MEDICATIONS  (PRN):  HYDROmorphone  Injectable 0.5 milliGRAM(s) IV Push every 10 minutes PRN Moderate Pain (4 - 6)  ondansetron Injectable 4 milliGRAM(s) IV Push once PRN Nausea and/or Vomiting  senna 2 Tablet(s) Oral at bedtime PRN Constipation    Vital Signs Last 24 Hrs  T(C): 37 (06 Mar 2024 13:00), Max: 37 (06 Mar 2024 13:00)  T(F): 98.6 (06 Mar 2024 13:00), Max: 98.6 (06 Mar 2024 13:00)  HR: 95 (06 Mar 2024 13:15) (57 - 95)  BP: 124/71 (06 Mar 2024 13:15) (114/99 - 148/97)  BP(mean): 86 (06 Mar 2024 13:15) (82 - 110)  RR: 13 (06 Mar 2024 13:15) (11 - 18)  SpO2: 98% (06 Mar 2024 13:15) (96% - 99%)    Parameters below as of 06 Mar 2024 11:30  Patient On (Oxygen Delivery Method): room air    CAPILLARY BLOOD GLUCOSE    PHYSICAL EXAM:  GENERAL: NAD, well-developed  HEAD: Atraumatic, Normocephalic  EYES: EOMI, conjunctiva and sclera clear  NECK: Supple, No JVD  CHEST/LUNG: Clear to auscultation bilaterally; No wheeze  HEART: Regular rate and rhythm; No murmurs, rubs, or gallops  ABDOMEN: Soft, mildly tender, nondistended; laparoscopic incision sites c/d/i  EXTREMITIES:  2+ Peripheral Pulses, No clubbing, cyanosis, or edema  NEUROLOGY: non-focal  SKIN: No rashes or lesions    LABS:    EKG(personally reviewed):    RADIOLOGY & ADDITIONAL TESTS:    Imaging Personally Reviewed:    Consultant(s) Notes Reviewed:      Care Discussed with Consultants/Other Providers:

## 2024-03-06 NOTE — CONSULT NOTE ADULT - ASSESSMENT
59 y/o M with history of atrophic right kidney that initially presented for planned R radical nephrectomy. Now s/p R Radical Nephrectomy on 3/6. Medicine following for comanagement.

## 2024-03-06 NOTE — BRIEF OPERATIVE NOTE - NSICDXBRIEFPROCEDURE_GEN_ALL_CORE_FT
PROCEDURES:  Laparoscopic radical nephrectomy 06-Mar-2024 09:59:46 lap simple RIGHT nephrectomy Pedrito Martines

## 2024-03-06 NOTE — CONSULT NOTE ADULT - PROBLEM SELECTOR RECOMMENDATION 3
Patient with history of atrophic right kidney  - outpatient records reviewed, patient with baselike Cr around 1.4-1.6 noted since 10/2023  - Cr currently 1.4  - cont to trend daily  - avoid nephrotoxic medications, renally dose medications

## 2024-03-07 ENCOUNTER — TRANSCRIPTION ENCOUNTER (OUTPATIENT)
Age: 59
End: 2024-03-07

## 2024-03-07 VITALS
TEMPERATURE: 98 F | SYSTOLIC BLOOD PRESSURE: 120 MMHG | OXYGEN SATURATION: 99 % | DIASTOLIC BLOOD PRESSURE: 67 MMHG | HEART RATE: 68 BPM | RESPIRATION RATE: 18 BRPM

## 2024-03-07 DIAGNOSIS — M25.512 PAIN IN LEFT SHOULDER: ICD-10-CM

## 2024-03-07 LAB
ANION GAP SERPL CALC-SCNC: 10 MMOL/L — SIGNIFICANT CHANGE UP (ref 7–14)
BUN SERPL-MCNC: 15 MG/DL — SIGNIFICANT CHANGE UP (ref 7–23)
CALCIUM SERPL-MCNC: 8.9 MG/DL — SIGNIFICANT CHANGE UP (ref 8.4–10.5)
CHLORIDE SERPL-SCNC: 108 MMOL/L — HIGH (ref 98–107)
CO2 SERPL-SCNC: 24 MMOL/L — SIGNIFICANT CHANGE UP (ref 22–31)
CREAT SERPL-MCNC: 1.22 MG/DL — SIGNIFICANT CHANGE UP (ref 0.5–1.3)
EGFR: 69 ML/MIN/1.73M2 — SIGNIFICANT CHANGE UP
GLUCOSE SERPL-MCNC: 100 MG/DL — HIGH (ref 70–99)
HCT VFR BLD CALC: 40 % — SIGNIFICANT CHANGE UP (ref 39–50)
HGB BLD-MCNC: 13.1 G/DL — SIGNIFICANT CHANGE UP (ref 13–17)
MAGNESIUM SERPL-MCNC: 1.9 MG/DL — SIGNIFICANT CHANGE UP (ref 1.6–2.6)
MCHC RBC-ENTMCNC: 29 PG — SIGNIFICANT CHANGE UP (ref 27–34)
MCHC RBC-ENTMCNC: 32.8 GM/DL — SIGNIFICANT CHANGE UP (ref 32–36)
MCV RBC AUTO: 88.7 FL — SIGNIFICANT CHANGE UP (ref 80–100)
NRBC # BLD: 0 /100 WBCS — SIGNIFICANT CHANGE UP (ref 0–0)
NRBC # FLD: 0 K/UL — SIGNIFICANT CHANGE UP (ref 0–0)
PHOSPHATE SERPL-MCNC: 2.7 MG/DL — SIGNIFICANT CHANGE UP (ref 2.5–4.5)
PLATELET # BLD AUTO: 146 K/UL — LOW (ref 150–400)
POTASSIUM SERPL-MCNC: 4.6 MMOL/L — SIGNIFICANT CHANGE UP (ref 3.5–5.3)
POTASSIUM SERPL-SCNC: 4.6 MMOL/L — SIGNIFICANT CHANGE UP (ref 3.5–5.3)
RBC # BLD: 4.51 M/UL — SIGNIFICANT CHANGE UP (ref 4.2–5.8)
RBC # FLD: 12.9 % — SIGNIFICANT CHANGE UP (ref 10.3–14.5)
SODIUM SERPL-SCNC: 142 MMOL/L — SIGNIFICANT CHANGE UP (ref 135–145)
WBC # BLD: 9.5 K/UL — SIGNIFICANT CHANGE UP (ref 3.8–10.5)
WBC # FLD AUTO: 9.5 K/UL — SIGNIFICANT CHANGE UP (ref 3.8–10.5)

## 2024-03-07 PROCEDURE — 99233 SBSQ HOSP IP/OBS HIGH 50: CPT

## 2024-03-07 RX ORDER — POLYETHYLENE GLYCOL 3350 17 G/17G
17 POWDER, FOR SOLUTION ORAL
Qty: 0 | Refills: 0 | DISCHARGE
Start: 2024-03-07

## 2024-03-07 RX ORDER — ACETAMINOPHEN 500 MG
3 TABLET ORAL
Qty: 0 | Refills: 0 | DISCHARGE
Start: 2024-03-07

## 2024-03-07 RX ORDER — SODIUM CHLORIDE 9 MG/ML
1000 INJECTION, SOLUTION INTRAVENOUS
Refills: 0 | Status: DISCONTINUED | OUTPATIENT
Start: 2024-03-07 | End: 2024-03-07

## 2024-03-07 RX ORDER — SENNA PLUS 8.6 MG/1
2 TABLET ORAL
Qty: 0 | Refills: 0 | DISCHARGE
Start: 2024-03-07

## 2024-03-07 RX ADMIN — Medication 975 MILLIGRAM(S): at 02:20

## 2024-03-07 RX ADMIN — Medication 975 MILLIGRAM(S): at 06:57

## 2024-03-07 RX ADMIN — Medication 10 MILLIGRAM(S): at 05:58

## 2024-03-07 RX ADMIN — Medication 975 MILLIGRAM(S): at 07:39

## 2024-03-07 RX ADMIN — HEPARIN SODIUM 5000 UNIT(S): 5000 INJECTION INTRAVENOUS; SUBCUTANEOUS at 05:58

## 2024-03-07 RX ADMIN — Medication 975 MILLIGRAM(S): at 01:20

## 2024-03-07 RX ADMIN — Medication 975 MILLIGRAM(S): at 12:03

## 2024-03-07 NOTE — PROGRESS NOTE ADULT - PROBLEM SELECTOR PLAN 3
Patient noted with brief episode of chest pressure while in PACU after OR case  - patient reports that his symptoms were likely due to "anxiety"  - states he had similar episode after his previous surgery which also self resolved  - currently reports all symptoms resolved  - VSS and with NSR on telemetry during episode  - patient otherwise without any cardiac history  - no further complaints of chest pain this admission

## 2024-03-07 NOTE — PROGRESS NOTE ADULT - PROBLEM SELECTOR PLAN 2
Patient endorsing left shoulder pain  - worsened with reaching L arm behind back  - ROM assessed, all normal  - LUE with regular strength and sensation  - operative note reviewed, patient noted to have been placed in right flank up position during recent OR case  - suspect that patient with some muscle strain/ache 2/2 being left shoulder down during procedure  - can utilize warm compresses/tylenol PRN

## 2024-03-07 NOTE — DISCHARGE NOTE NURSING/CASE MANAGEMENT/SOCIAL WORK - PATIENT PORTAL LINK FT
You can access the FollowMyHealth Patient Portal offered by [facility] by registering at the following website: [email or web address]. By joining [facility]’s FollowMyHealth portal, you will also be able to view your health information using other applications (apps) compatible with our system.

## 2024-03-07 NOTE — PROGRESS NOTE ADULT - SUBJECTIVE AND OBJECTIVE BOX
Post op Check    Pt seen and examined without complaints. Pain is controlled. Denies SOB/CP/N/V.     Vital Signs Last 24 Hrs  T(C): 37 (06 Mar 2024 13:00), Max: 37 (06 Mar 2024 13:00)  T(F): 98.6 (06 Mar 2024 13:00), Max: 98.6 (06 Mar 2024 13:00)  HR: 95 (06 Mar 2024 13:15) (57 - 95)  BP: 124/71 (06 Mar 2024 13:15) (114/99 - 148/97)  BP(mean): 86 (06 Mar 2024 13:15) (82 - 110)  RR: 13 (06 Mar 2024 13:15) (11 - 18)  SpO2: 98% (06 Mar 2024 13:15) (96% - 99%)    Parameters below as of 06 Mar 2024 11:30  Patient On (Oxygen Delivery Method): room air        I&O's Summary    06 Mar 2024 07:01  -  06 Mar 2024 15:00  --------------------------------------------------------  IN: 250 mL / OUT: 70 mL / NET: 180 mL        Physical Exam  Gen: NAD  Pulm: No respiratory distress, no subcostal retractions  Abd: Softly distended, mild TTP over incisions. incision dressings c/d/i  : nelson in place with yellow urine                
Subjective  Denies fevers, chills, nausea, vomiting, SOB, CP.  Tolerating diet. Passing flatus.  Nelson removed.    Objective    Vital signs  T(F): , Max: 98.6 (03-06-24 @ 13:00)  HR: 64 (03-07-24 @ 05:45)  BP: 120/64 (03-07-24 @ 05:45)  SpO2: 97% (03-07-24 @ 05:45)  Wt(kg): --    Output     OUT:    Indwelling Catheter - Urethral (mL): 4225 mL  Total OUT: 4225 mL    Total NET: -4225 mL          Gen: NAD  Abd: soft, nontender, nondistended, incisions c/d/i  : neslon removed    Labs          Culture - Urine (collected 02-29-24 @ 21:57)  Source: Clean Catch Clean Catch (Midstream)  Final Report (03-01-24 @ 21:25):    No growth      
[facility] Department of Hospital Medicine  Terri Price MD  Available on MS Teams  Pager: [phone]    Patient is a 58y old  Male who presents with a chief complaint of Removal of right kidney (07 Mar 2024 09:49)    OVERNIGHT EVENTS: No acute events overnight.    SUBJECTIVE: Pt seen and examined at bedside this morning. Reports that he overall feels well. Passing flatus. Endorses that he has been having left sided shoulder pain which is worsened when reaching his left arm behind his back. Otherwise denies any acute complaints. Denies any issues with his LUE ROM. No fevers, chill, nausea, vomiting, CP or SOB.    ADDITIONAL REVIEW OF SYSTEMS: as above.    MEDICATIONS  (STANDING):  acetaminophen     Tablet .. 975 milliGRAM(s) Oral every 6 hours  heparin   Injectable 5000 Unit(s) SubCutaneous every 8 hours  polyethylene glycol 3350 17 Gram(s) Oral daily    MEDICATIONS  (PRN):  senna 2 Tablet(s) Oral at bedtime PRN Constipation    CAPILLARY BLOOD GLUCOSE    I&O's Summary    06 Mar 2024 07:01  -  07 Mar 2024 07:00  --------------------------------------------------------  IN: 850 mL / OUT: 4225 mL / NET: -3375 mL    07 Mar 2024 07:01  -  07 Mar 2024 13:09  --------------------------------------------------------  IN: 0 mL / OUT: 300 mL / NET: -300 mL    PHYSICAL EXAM:  Vital Signs Last 24 Hrs  T(C): 36.7 (07 Mar 2024 09:51), Max: 36.9 (07 Mar 2024 05:45)  T(F): 98.1 (07 Mar 2024 09:51), Max: 98.4 (07 Mar 2024 05:45)  HR: 68 (07 Mar 2024 09:51) (64 - 101)  BP: 120/67 (07 Mar 2024 09:51) (105/58 - 132/76)  BP(mean): 77 (06 Mar 2024 17:00) (77 - 92)  RR: 18 (07 Mar 2024 09:51) (13 - 18)  SpO2: 99% (07 Mar 2024 09:51) (96% - 100%)    Parameters below as of 07 Mar 2024 09:51  Patient On (Oxygen Delivery Method): room air    CONSTITUTIONAL: NAD, well-developed  HEAD: Normocephalic, atraumatic  EYES: EOMI, PERRL  ENT: no rhinorrhea, no pharyngeal erythema  RESPIRATORY: No increased work of breathing, CTAB, no wheezes or crackles appreciated  CARDIOVASCULAR: RRR, S1 and S2 present, no m/r/g  ABDOMEN: soft, NT, ND, bowel sounds present, laparoscopic incision sites c/d/i  EXTREMITIES: No LE edema  MUSCULOSKELETAL: no joint swelling, left anterior shoulder with mild TTP  NEURO: A&Ox3, moving all extremities    LABS:                        13.1   9.50  )-----------( 146      ( 07 Mar 2024 06:05 )             40.0     03-07    142  |  108<H>  |  15  ----------------------------<  100<H>  4.6   |  24  |  1.22    Ca    8.9      07 Mar 2024 06:05  Phos  2.7     03-07  Mg     1.90     03-07    Urinalysis Basic - ( 07 Mar 2024 06:05 )    Color: x / Appearance: x / SG: x / pH: x  Gluc: 100 mg/dL / Ketone: x  / Bili: x / Urobili: x   Blood: x / Protein: x / Nitrite: x   Leuk Esterase: x / RBC: x / WBC x   Sq Epi: x / Non Sq Epi: x / Bacteria: x    RADIOLOGY & ADDITIONAL TESTS:    Results Reviewed:   Imaging Personally Reviewed:  Electrocardiogram Personally Reviewed:    COORDINATION OF CARE:  Care Discussed with Consultants/Other Providers [Y/N]:  Prior or Outpatient Records Reviewed [Y/N]:

## 2024-03-07 NOTE — DISCHARGE NOTE NURSING/CASE MANAGEMENT/SOCIAL WORK - NSDCPEFALRISK_GEN_ALL_CORE
For information on Fall & Injury Prevention, visit: [email or web address] OR  [email or web address] OR  https://www.cdc.gov/steadi/patient.html

## 2024-03-07 NOTE — DISCHARGE NOTE PROVIDER - CARE PROVIDER_API CALL
Pradip Hardy  Urology  [address], [address]  [place] [address]  Phone: [phone]  Fax: [phone]  Follow Up Time:     Akbar Horn  [facility]  [address] [place] [address]  Phone: [phone]  Fax: [phone]  Follow Up Time:

## 2024-03-07 NOTE — DISCHARGE NOTE NURSING/CASE MANAGEMENT/SOCIAL WORK - NSDCPNINST_GEN_ALL_CORE
Make a follow up appointment with Dr. Hardy. Call MD if you develop a fever, or if there is redness, swelling, drainage or pain not relieved by pain medication. No heavy lifting, bending, or straining to move your bowels. Take over the counter stool softeners as needed to prevent constipation which may be caused by pain medication. Drink plenty of liquids.

## 2024-03-07 NOTE — DISCHARGE NOTE PROVIDER - NSDCCPCAREPLAN_GEN_ALL_CORE_FT
PRINCIPAL DISCHARGE DIAGNOSIS  Diagnosis: Atrophic kidney  Assessment and Plan of Treatment: Keep well hydrated.  No heavy lifting (greater than 10 pounds) or straining for 4 to 6 weeks.  You may shower, just pat white strips dry, they will fall off in a few weeks.   Dr. Hardy's office will call you  to schedule a follow up appointment.  Call the office if you have fever greater than 101, difficulty urinating, your urine becomes bloody, pain not relieved with pain medication, nausea/vomiting.

## 2024-03-07 NOTE — DISCHARGE NOTE PROVIDER - NSDCMRMEDTOKEN_GEN_ALL_CORE_FT
acetaminophen 325 mg oral tablet: 3 tab(s) orally every 6 hours as needed for pain  polyethylene glycol 3350 oral powder for reconstitution: 17 gram(s) orally once a day as needed for  constipation  senna leaf extract oral tablet: 2 tab(s) orally once a day (at bedtime) As needed Constipation

## 2024-03-07 NOTE — PROGRESS NOTE ADULT - PROBLEM SELECTOR PLAN 4
Patient with history of atrophic right kidney  - outpatient records reviewed, patient with baselike Cr around 1.4-1.6 noted since 10/2023  - Cr currently 1.4  - cont to trend daily  - avoid nephrotoxic medications, renally dose medications.

## 2024-03-07 NOTE — PROGRESS NOTE ADULT - ASSESSMENT
58M s/p R simply Nx    -advance to reg diet  -MIKE PADILLA labs  -OGARDENIA  -MO home today
A/P: 58y Male s/p lap simple RIGHT nephrectomy, POD#0. Doing well post operatively.    AM Labs  Ambulate as tolerated  AM dulcolax  CLD/IVF  Monitor GI function  AM TOV  DVT prophylaxis/OOB  Incentive spirometry  Strict I&O's  Analgesia and antiemetics as needed    
57 y/o M with history of atrophic right kidney that initially presented for planned R radical nephrectomy. Now s/p R Radical Nephrectomy on 3/6. Medicine following for comanagement.

## 2024-03-07 NOTE — DISCHARGE NOTE PROVIDER - CARE PROVIDERS DIRECT ADDRESSES
[email or web address]

## 2024-03-07 NOTE — PROGRESS NOTE ADULT - PROBLEM SELECTOR PLAN 5
Patient reports prior history of smoking, reports he quit years ago  - endorses current infrequent chewing tobacco use, states he is trying to quit  - patient offered nicotine replacement therapy but declining at this time.

## 2024-03-07 NOTE — DISCHARGE NOTE PROVIDER - HOSPITAL COURSE
57 yo M underwent uncomplicated right lap simple nephrectomy on 3/6/2024.  Postoperative course uneventful, successful TOV on POD #1,  pain controlled, ambulating.  Return of GI function on POD #1, diet advanced without incident.   Pt d/c on POD #1 to f/u with Dr. Hardy.  I-stop checked.   59 yo M underwent uncomplicated right lap simple nephrectomy on 3/6/2024.  Postoperative course uneventful, successful TOV on POD #1,  pain controlled, ambulating.  Return of GI function on POD #1, diet advanced without incident.   Pt d/c on POD #1 to f/u with Dr. Hardy.

## 2024-03-07 NOTE — PROGRESS NOTE ADULT - PROBLEM SELECTOR PLAN 1
Patient noted with R atrophic kidney  - NM renal scan done on 1/5/2024 showing minimal function of the right kidney (9% radionuclide uptake compared to 91% by the left kidney)  - now s/p right radical nephrectomy on 3/6  - patient currently reports feeling well  - Tolerating water currently  - passed TOV

## 2024-03-11 LAB — SURGICAL PATHOLOGY STUDY: SIGNIFICANT CHANGE UP

## 2024-03-12 PROBLEM — E78.2 MIXED HYPERLIPIDEMIA: Chronic | Status: ACTIVE | Noted: 2024-02-29

## 2024-03-12 PROBLEM — N26.1 ATROPHY OF KIDNEY (TERMINAL): Chronic | Status: ACTIVE | Noted: 2024-02-29

## 2024-03-12 PROBLEM — K40.90 UNILATERAL INGUINAL HERNIA, WITHOUT OBSTRUCTION OR GANGRENE, NOT SPECIFIED AS RECURRENT: Chronic | Status: ACTIVE | Noted: 2024-02-29

## 2024-03-12 PROBLEM — N20.0 CALCULUS OF KIDNEY: Chronic | Status: ACTIVE | Noted: 2024-02-29

## 2024-03-12 PROBLEM — N13.5 CROSSING VESSEL AND STRICTURE OF URETER WITHOUT HYDRONEPHROSIS: Chronic | Status: ACTIVE | Noted: 2024-02-29

## 2024-03-27 ENCOUNTER — APPOINTMENT (OUTPATIENT)
Dept: UROLOGY | Facility: CLINIC | Age: 59
End: 2024-03-27
Payer: MEDICAID

## 2024-03-27 DIAGNOSIS — N28.9 DISORDER OF KIDNEY AND URETER, UNSPECIFIED: ICD-10-CM

## 2024-03-27 PROCEDURE — 99024 POSTOP FOLLOW-UP VISIT: CPT

## 2024-03-27 NOTE — ASSESSMENT
[FreeTextEntry1] :  Kan is doing well . He has been walking 1-3 miles per day . He finds this is tiresome I explained he needs to walk but not long distances . I removed his steri strips Wounds are healing well with no evidence of infection. He has a chronic ,intermittant pain on his  left lower abdomen . It is worse whe he is gassy or before a BM This has been going on for 2 years He was told by multiple physicians that it is muscular . I cannot explain it I referred him to GI  He can play golf in 2-3 weeks He can increase his lifting  He will see us PRN

## 2024-03-27 NOTE — HISTORY OF PRESENT ILLNESS
[FreeTextEntry1] : History of stones and multiple ureterscopy . Found to have decrease function in his right  kidney 9% Due to pain decision made to remove the kidney  Single Port Robotic simple nephrectomy done March 6, 2024 [None] : no symptoms

## 2024-03-27 NOTE — PHYSICAL EXAM
[Normal Appearance] : normal appearance [Well Groomed] : well groomed [General Appearance - In No Acute Distress] : no acute distress [Edema] : no peripheral edema [Respiration, Rhythm And Depth] : normal respiratory rhythm and effort [Exaggerated Use Of Accessory Muscles For Inspiration] : no accessory muscle use [Abdomen Soft] : soft [Abdomen Tenderness] : non-tender [Urinary Bladder Findings] : the bladder was normal on palpation [] : no rash [Normal Station and Gait] : the gait and station were normal for the patient's age [Oriented To Time, Place, And Person] : oriented to person, place, and time [No Focal Deficits] : no focal deficits [Affect] : the affect was normal [Mood] : the mood was normal [de-identified] : incisions  [No Palpable Adenopathy] : no palpable adenopathy

## 2024-06-11 NOTE — H&P PST ADULT - ADMIT DATE
We want to give the best care possible. If you are notified to take a patient experience survey, please take the time to answer the questions. Your feedback helps us know how we are doing and we really appreciate it. Thank you!     29-Feb-2024

## 2025-03-26 ENCOUNTER — APPOINTMENT (OUTPATIENT)
Dept: UROLOGY | Facility: CLINIC | Age: 60
End: 2025-03-26
Payer: COMMERCIAL

## 2025-03-26 VITALS
DIASTOLIC BLOOD PRESSURE: 78 MMHG | BODY MASS INDEX: 25.18 KG/M2 | WEIGHT: 170 LBS | HEART RATE: 69 BPM | TEMPERATURE: 98 F | HEIGHT: 69 IN | SYSTOLIC BLOOD PRESSURE: 120 MMHG

## 2025-03-26 DIAGNOSIS — N26.1 ATROPHY OF KIDNEY (TERMINAL): ICD-10-CM

## 2025-03-26 DIAGNOSIS — R97.20 ELEVATED PROSTATE, SPECIFIC ANTIGEN [PSA]: ICD-10-CM

## 2025-03-26 PROCEDURE — 99214 OFFICE O/P EST MOD 30 MIN: CPT

## 2025-03-26 RX ORDER — OXYBUTYNIN CHLORIDE 10 MG/1
10 TABLET, EXTENDED RELEASE ORAL
Qty: 30 | Refills: 2 | Status: ACTIVE | COMMUNITY
Start: 2025-03-26 | End: 1900-01-01

## 2025-04-04 ENCOUNTER — APPOINTMENT (OUTPATIENT)
Dept: MRI IMAGING | Facility: CLINIC | Age: 60
End: 2025-04-04

## 2025-04-04 ENCOUNTER — RESULT REVIEW (OUTPATIENT)
Age: 60
End: 2025-04-04

## 2025-04-04 PROCEDURE — 72197 MRI PELVIS W/O & W/DYE: CPT

## 2025-04-04 PROCEDURE — A9585: CPT | Mod: JW

## 2025-04-04 PROCEDURE — 76498P: CUSTOM

## 2025-04-30 ENCOUNTER — APPOINTMENT (OUTPATIENT)
Dept: UROLOGY | Facility: CLINIC | Age: 60
End: 2025-04-30
Payer: COMMERCIAL

## 2025-04-30 VITALS
HEIGHT: 69 IN | SYSTOLIC BLOOD PRESSURE: 116 MMHG | HEART RATE: 69 BPM | TEMPERATURE: 97.3 F | DIASTOLIC BLOOD PRESSURE: 66 MMHG | WEIGHT: 170 LBS | BODY MASS INDEX: 25.18 KG/M2

## 2025-04-30 PROCEDURE — 99214 OFFICE O/P EST MOD 30 MIN: CPT

## 2025-05-28 ENCOUNTER — APPOINTMENT (OUTPATIENT)
Dept: UROLOGY | Facility: CLINIC | Age: 60
End: 2025-05-28

## (undated) DEVICE — TROCAR COVIDIEN VERSAPORT BLADELESS OPTICAL 12MM STANDARD

## (undated) DEVICE — D HELP - CLEARVIEW CLEARIFY SYSTEM

## (undated) DEVICE — TROCAR COVIDIEN VERSAPORT BLADELESS OPTICAL 5MM STANDARD

## (undated) DEVICE — BASIN SET SINGLE

## (undated) DEVICE — GLV 7.5 PROTEXIS (WHITE)

## (undated) DEVICE — DRSG STERISTRIPS 0.5 X 4"

## (undated) DEVICE — DRAPE LAPAROTOMY W VELCRO CORD TABS

## (undated) DEVICE — Device

## (undated) DEVICE — DRAIN RESERVOIR FOR JACKSON PRATT 100CC CARDINAL

## (undated) DEVICE — RETRACTOR COVIDIEN ENDOPADDLE 12MM DISP

## (undated) DEVICE — DRSG TEGADERM 4X4.75"

## (undated) DEVICE — SHEARS COVIDIEN ENDO SHEAR 5MM X 31CM W UNIPOLAR CAUTERY

## (undated) DEVICE — CANISTER DISPOSABLE THIN WALL 3000CC

## (undated) DEVICE — VENODYNE/SCD SLEEVE CALF MEDIUM

## (undated) DEVICE — GLV 7 PROTEXIS (WHITE)

## (undated) DEVICE — ELCTR HANDSWITCHING 5MM ABC

## (undated) DEVICE — STAPLER COVIDIEN ENDO GIA STANDARD HANDLE

## (undated) DEVICE — ELCTR GROUNDING PAD ADULT COVIDIEN

## (undated) DEVICE — TAPE SILK 3"

## (undated) DEVICE — GOWN XL

## (undated) DEVICE — CATH INSERTION TRAY W 10CC SYRINGE

## (undated) DEVICE — TROCAR SURGIQUEST AIRSEAL 12MMX100MM

## (undated) DEVICE — POSITIONER FOAM EGG CRATE ULNAR 2PCS (PINK)

## (undated) DEVICE — SUT ENDOSTITCH POLYSORB 2-0 48" ES-9

## (undated) DEVICE — SOL IRR POUR H2O 500ML

## (undated) DEVICE — FOLEY CATH 2-WAY 16FR 5CC SILICONE

## (undated) DEVICE — LIGASURE ATLAS 10MM 37CM

## (undated) DEVICE — TUBING STRYKEFLOW II SUCTION / IRRIGATOR

## (undated) DEVICE — LABELS BLANK W PEN

## (undated) DEVICE — DRSG BENZOIN 0.6CC

## (undated) DEVICE — SUT VICRYL 1 27" CT-1

## (undated) DEVICE — SOL IRR BAG NS 0.9% 3000ML

## (undated) DEVICE — SUT VICRYL 0 27" CT-1

## (undated) DEVICE — BAG URINE W METER 2L

## (undated) DEVICE — SYR LUER LOK 10CC

## (undated) DEVICE — TUBING AIRSEAL TRI-LUMEN FILTERED

## (undated) DEVICE — SOL IRR BAG H2O 3000ML

## (undated) DEVICE — SUT VICRYL 4-0 27" SH

## (undated) DEVICE — DRAIN JACKSON PRATT 7FR ROUND END NO TROCAR

## (undated) DEVICE — TUBING OLYMPUS INSUFFLATION

## (undated) DEVICE — SUT VICRYL 2-0 27" UR-6

## (undated) DEVICE — PACK GENERAL LAPAROSCOPY

## (undated) DEVICE — SOL IRR POUR NS 0.9% 500ML